# Patient Record
Sex: MALE | Race: ASIAN | NOT HISPANIC OR LATINO | URBAN - METROPOLITAN AREA
[De-identification: names, ages, dates, MRNs, and addresses within clinical notes are randomized per-mention and may not be internally consistent; named-entity substitution may affect disease eponyms.]

---

## 2017-11-20 ENCOUNTER — HOSPITAL ENCOUNTER (OUTPATIENT)
Dept: GENERAL RADIOLOGY | Facility: CLINIC | Age: 27
Discharge: HOME | End: 2017-11-20
Attending: INTERNAL MEDICINE | Admitting: INTERNAL MEDICINE

## 2017-11-20 DIAGNOSIS — R76.11 POSITIVE PPD: ICD-10-CM

## 2017-11-20 PROCEDURE — 40000293 XR CHEST 1 VIEW, EMPLOYEE HEALTH

## 2017-12-04 ENCOUNTER — RECORDS - HEALTHEAST (OUTPATIENT)
Dept: ADMINISTRATIVE | Facility: OTHER | Age: 27
End: 2017-12-04

## 2017-12-08 ENCOUNTER — OFFICE VISIT (OUTPATIENT)
Dept: URGENT CARE | Facility: URGENT CARE | Age: 27
End: 2017-12-08
Payer: COMMERCIAL

## 2017-12-08 VITALS
WEIGHT: 141.9 LBS | TEMPERATURE: 98.8 F | SYSTOLIC BLOOD PRESSURE: 112 MMHG | OXYGEN SATURATION: 98 % | HEART RATE: 95 BPM | DIASTOLIC BLOOD PRESSURE: 72 MMHG

## 2017-12-08 DIAGNOSIS — L50.9 HIVES: Primary | ICD-10-CM

## 2017-12-08 PROCEDURE — 99203 OFFICE O/P NEW LOW 30 MIN: CPT | Performed by: PHYSICIAN ASSISTANT

## 2017-12-08 RX ORDER — PREDNISONE 20 MG/1
TABLET ORAL
Qty: 20 TABLET | Refills: 0 | Status: SHIPPED | OUTPATIENT
Start: 2017-12-08

## 2017-12-08 RX ORDER — DIPHENHYDRAMINE HCL 25 MG
25-50 TABLET ORAL EVERY 6 HOURS PRN
Qty: 60 TABLET | Refills: 0 | Status: SHIPPED | OUTPATIENT
Start: 2017-12-08

## 2017-12-08 ASSESSMENT — ENCOUNTER SYMPTOMS
CHILLS: 0
SHORTNESS OF BREATH: 0
HEADACHES: 0
NAUSEA: 0
DIARRHEA: 0
VOMITING: 0
ABDOMINAL PAIN: 0
FOCAL WEAKNESS: 0
FEVER: 0

## 2017-12-08 NOTE — MR AVS SNAPSHOT
"              After Visit Summary   2017    Kayce Devries    MRN: [id number]           Patient Information     Date Of Birth          1990        Visit Information        Provider Department      2017 10:00 AM Valencia Olsen PA-C [facility]        Today's Diagnoses     Hives    -  1       Follow-ups after your visit        Follow-up notes from your care team     Return if symptoms worsen or fail to improve.      Who to contact     If you have questions or need follow up information about today's clinic visit or your schedule please contact [facility] directly at [phone].  Normal or non-critical lab and imaging results will be communicated to you by [employer], letter or phone within 4 business days after the clinic has received the results. If you do not hear from us within 7 days, please contact the clinic through [employer] or phone. If you have a critical or abnormal lab result, we will notify you by phone as soon as possible.  Submit refill requests through [employer] or call your pharmacy and they will forward the refill request to us. Please allow 3 business days for your refill to be completed.          Additional Information About Your Visit        MyChart Information     [employer] lets you send messages to your doctor, view your test results, renew your prescriptions, schedule appointments and more. To sign up, go to [email or web address] . Click on \"Log in\" on the left side of the screen, which will take you to the Welcome page. Then click on \"Sign up Now\" on the right side of the page.     You will be asked to enter the access code listed below, as well as some personal information. Please follow the directions to create your username and password.     Your access code is: [email or web address]  Expires: 3/8/2018 10:31 AM     Your access code will  in 90 days. If you need help or a new code, please call your [place] clinic or [phone].        Care " EveryWhere ID     This is your Care EveryWhere ID. This could be used by other organizations to access your [place] medical records  [id number]        Your Vitals Were     Pulse Temperature Pulse Oximetry             95 98.8  F (37.1  C) (Oral) 98%          Blood Pressure from Last 3 Encounters:   12/08/17 112/72    Weight from Last 3 Encounters:   12/08/17 141 lb 14.4 oz (64.4 kg)              Today, you had the following     No orders found for display         Today's Medication Changes          These changes are accurate as of: 12/8/17 10:31 AM.  If you have any questions, ask your nurse or doctor.               Start taking these medicines.        Dose/Directions    diphenhydrAMINE 25 MG tablet   Commonly known as:  BENADRYL   Used for:  Hives   Started by:  Valencia Olsen PA-C        Dose:  25-50 mg   Take 1-2 tablets (25-50 mg) by mouth every 6 hours as needed for itching or allergies   Quantity:  60 tablet   Refills:  0       predniSONE 20 MG tablet   Commonly known as:  DELTASONE   Used for:  Hives   Started by:  Valencia Olsen PA-C        Take 3 tabs (60 mg) by mouth daily x 3 days, 2 tabs (40 mg) daily x 3 days, 1 tab (20 mg) daily x 3 days, then 1/2 tab (10 mg) x 3 days.   Quantity:  20 tablet   Refills:  0            Where to get your medicines      These medications were sent to [facility]cal[facility] - [address]   [address]  [address], [place] [address]     Phone:  [phone]     diphenhydrAMINE 25 MG tablet    predniSONE 20 MG tablet                Primary Care Provider Office Phone # Fax #    Fran Powell [phone]       [address] [place] [address] BABATUNDE BUCHANAN              [place] [address]        Equal Access to Services     [facility] PEPE AH: Hadii aad ku hadasho Soomaali, waaxda luqadaha, qaybta kaalmada adeegyada, waxay shelley sherwood. So [facility] [phone].    ATENCIÓN: Si aubree reynoso king  disposición servicios gratuitos de asistencia lingüística. [facility]aiser [phone].    We comply with applicable federal civil rights laws and [place] laws. We do not discriminate on the basis of race, color, national origin, age, disability, sex, sexual orientation, or gender identity.            Thank you!     Thank you for choosing [facility]  for your care. Our goal is always to provide you with excellent care. Hearing back from our patients is one way we can continue to improve our services. Please take a few minutes to complete the written survey that you may receive in the mail after your visit with us. Thank you!             Your Updated Medication List - Protect others around you: Learn how to safely use, store and throw away your medicines at www.disposemymeds.org.          This list is accurate as of: 12/8/17 10:31 AM.  Always use your most recent med list.                   Brand Name Dispense Instructions for use Diagnosis    diphenhydrAMINE 25 MG tablet    BENADRYL    60 tablet    Take 1-2 tablets (25-50 mg) by mouth every 6 hours as needed for itching or allergies    Hives       predniSONE 20 MG tablet    DELTASONE    20 tablet    Take 3 tabs (60 mg) by mouth daily x 3 days, 2 tabs (40 mg) daily x 3 days, 1 tab (20 mg) daily x 3 days, then 1/2 tab (10 mg) x 3 days.    Hives

## 2017-12-08 NOTE — NURSING NOTE
Chief Complaint   Patient presents with     Urgent Care     Derm Problem     Pt states woke up with red itchy rash all over body.        Initial /72 (BP Location: Right arm, Patient Position: Chair, Cuff Size: Adult Regular)  Pulse 95  Temp 98.8  F (37.1  C) (Oral)  Wt 141 lb 14.4 oz (64.4 kg)  SpO2 98% There is no height or weight on file to calculate BMI.  Medication Reconciliation: unable or not appropriate to perform   Luis Hanley CMA (IVETT) 12/8/2017 10:09 AM

## 2017-12-08 NOTE — PROGRESS NOTES
HPI  December 8, 2017    HPI: Kayce Devries is a 27 year old male who complains of pruritic rash onset yesterday. Rash began on R side and spread to entire body gradually. It is painful. Pt denies contacts with anyone with a similar rash. No new soaps, detergents, lotions, foods, or other products. He did start taking Tylenol for an injury the day prior to rash onset. Took Tylenol yesterday as well. Has tried hydrocortisone. Symptoms are moderate in severity & constant in duration. Denies throat/tongue swelling, CP, SOB, abd pain, N/VD, sore throat, and any other symptoms.    History reviewed. No pertinent past medical history.  History reviewed. No pertinent surgical history.  Social History   Substance Use Topics     Smoking status: Current Every Day Smoker     Smokeless tobacco: Never Used     Alcohol use Not on file       Problem list, Medication list, Allergies, and Medical/Social/Surgical histories reviewed in [facility] and updated as appropriate.      Review of Systems   Constitutional: Negative for chills and fever.   Respiratory: Negative for shortness of breath.    Cardiovascular: Negative for chest pain.   Gastrointestinal: Negative for abdominal pain, diarrhea, nausea and vomiting.   Skin: Positive for rash.   Neurological: Negative for focal weakness and headaches.   All other systems reviewed and are negative.        Physical Exam   Constitutional: He is oriented to person, place, and time and well-developed, well-nourished, and in no distress.   HENT:   Head: Normocephalic and atraumatic.   Mouth/Throat: Uvula is midline and mucous membranes are normal. No posterior oropharyngeal edema.   Cardiovascular: Normal rate, regular rhythm and normal heart sounds.    Pulmonary/Chest: Effort normal and breath sounds normal.   Musculoskeletal: Normal range of motion.   Neurological: He is alert and oriented to person, place, and time. Gait normal.   Skin: Skin is warm and dry. Rash noted. Rash is urticarial (difuse).    Nursing note and vitals reviewed.    Vital Signs  /72 (BP Location: Right arm, Patient Position: Chair, Cuff Size: Adult Regular)  Pulse 95  Temp 98.8  F (37.1  C) (Oral)  Wt 141 lb 14.4 oz (64.4 kg)  SpO2 98%     Diagnostic Test Results:  none     ASSESSMENT/PLAN      ICD-10-CM    1. Hives L50.9 predniSONE (DELTASONE) 20 MG tablet     diphenhydrAMINE (BENADRYL) 25 MG tablet      Pt with urticaria, possibly from Tylenol. No s/sx anaphylaxis. Advised pt to avoid Tylenol, and will Rx Benadryl and prednisone. Discontinue hydrocortisone.      I have discussed any lab or imaging results, the patient's diagnosis, and my plan of treatment with the patient and/or family. Patient is aware to come back in if with worsening symptoms or if no relief despite treatment plan.  Patient voiced understanding and had no further questions.       Follow Up: Return if symptoms worsen or fail to improve.    CADY Vazquez, [facility]

## 2018-05-22 ENCOUNTER — OFFICE VISIT - HEALTHEAST (OUTPATIENT)
Dept: FAMILY MEDICINE | Facility: CLINIC | Age: 28
End: 2018-05-22

## 2018-05-22 DIAGNOSIS — Z76.89 ENCOUNTER TO ESTABLISH CARE: ICD-10-CM

## 2018-05-22 DIAGNOSIS — B34.9 VIRAL SYNDROME: ICD-10-CM

## 2018-05-22 ASSESSMENT — MIFFLIN-ST. JEOR: SCORE: 1581.87

## 2018-09-24 ENCOUNTER — HOSPITAL ENCOUNTER (EMERGENCY)
Facility: CLINIC | Age: 28
Discharge: HOME | End: 2018-09-24
Payer: COMMERCIAL

## 2018-09-24 VITALS
OXYGEN SATURATION: 98 % | DIASTOLIC BLOOD PRESSURE: 82 MMHG | TEMPERATURE: 98.8 F | SYSTOLIC BLOOD PRESSURE: 123 MMHG | RESPIRATION RATE: 16 BRPM

## 2018-09-24 NOTE — ED TRIAGE NOTES
Pt comes in with lower back pain. 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 Hot pack applied. Alert and oriented, vss.

## 2021-07-15 VITALS — BODY MASS INDEX: 19.1 KG/M2 | WEIGHT: 136.4 LBS | HEIGHT: 71 IN

## 2021-07-15 NOTE — PROGRESS NOTES
ASSESSMENT AND PLAN  1. Viral syndrome he had developed a viral illness which caused fever cough nasal congestion and pharyngitis lasted for approximately 4 days he missed a trip that he had planned and has bought supplemental insurance.  Currently has no symptoms no fever eating well return to work a day after the symptoms dissipated.  I filled out his son country insurance form.    2. Encounter to establish care reviewed past records he has been seen at the [facility] ER twice.  He has suffered a facial fracture after falling and has been evaluated by the ER physicians and by Dr. Dalton who is a plastic surgeon no operative work has been done he denies any pain in the left zygomatic arch.  He denies any discomfort around his left orbit.  I advised him to return for a follow-up and to establish care with an annual physical examination since he has noted done recent past.          CHIEF COMPLAINT:  Chief Complaint   Patient presents with     Nasal Congestion     on May ll     Sore Throat     on May 11     Establish Care       HISTORY OF PRESENT ILLNESS:  Fnu is a 28 y.o. male presenting to the clinic today to establish care. On 5/11/2018, he developed a fever, sore throat and nasal congestion. He notes that his symptoms persisted until 5/16/2018. Now his symptoms have resolved and he is feeling much better. He presents with forms today because he missed his flight on 5/12/2018. He purchased flight insurance and [employer] requests a provider note to receive a reimbursement.     REVIEW OF SYSTEMS:   He denies left facial pain.   All other 10 point review of systems are negative.    PFS:  Works at [employer]. Pertinent past, family, social and medical history reviewed.   History   Smoking Status     Current Every Day Smoker     Types: Cigarettes   Smokeless Tobacco     Never Used       No family history on file.    Social History     Social History     Marital status: Single     Spouse name: N/A      "Number of children: N/A     Years of education: N/A     Occupational History     Not on file.     Social History Main Topics     Smoking status: Current Every Day Smoker     Types: Cigarettes     Smokeless tobacco: Never Used     Alcohol use Yes      Comment: occasional     Drug use: No     Sexual activity: Not on file     Other Topics Concern     Not on file     Social History Narrative     No narrative on file       No past surgical history on file.    No Known Allergies    Active Ambulatory Problems     Diagnosis Date Noted     No Active Ambulatory Problems     Resolved Ambulatory Problems     Diagnosis Date Noted     No Resolved Ambulatory Problems     No Additional Past Medical History       VITALS:  Vitals:    05/22/18 1045   BP: 110/70   Patient Site: Right Arm   Patient Position: Sitting   Cuff Size: Adult Regular   Pulse: 73   Temp: 97.5  F (36.4  C)   TempSrc: Oral   SpO2: 98%   Weight: 136 lb 6.4 oz (61.9 kg)   Height: 5' 10.75\" (1.797 m)     Wt Readings from Last 3 Encounters:   05/22/18 136 lb 6.4 oz (61.9 kg)     Body mass index is 19.16 kg/(m^2).      PHYSICAL EXAM:  General: Alert, cooperative, no distress, appears stated age  Head: Small swelling noted in the left suborbital area.  Measuring approximately 2 x 2 centimeters.  Eyes: PERRL, conjunctiva/cornea clear, EOM's intact, fundi benign, both eyes  Neurologic: No tremor, no focal findings.     Psych: Oriented x3. Affect normal.     ADDITIONAL HISTORY SUMMARIZED (2): Reviewed clinic note from Dr. Dalton  DECISION TO OBTAIN EXTRA INFORMATION (1): JUAN PABLO signed for [facility]  RADIOLOGY TESTS (1): None.  LABS (1): None.  MEDICINE TESTS (1): None.  INDEPENDENT REVIEW (2 each): None.     The visit lasted a total of 15 minutes face to face with the patient. Over 50% of the time was spent counseling and educating the patient about establish care and filling out forms.     Filiberto CASAS, am scribing for and in the presence of, Dr. Richey.    I, " elia temple, personally performed the services described in this documentation, as scribed by Filiberto Hall in my presence, and it is both accurate and complete.    MEDICATIONS:  No current outpatient prescriptions on file.     No current facility-administered medications for this visit.        Total data points:3